# Patient Record
Sex: MALE | Race: WHITE | Employment: OTHER | URBAN - NONMETROPOLITAN AREA
[De-identification: names, ages, dates, MRNs, and addresses within clinical notes are randomized per-mention and may not be internally consistent; named-entity substitution may affect disease eponyms.]

---

## 2018-06-19 ENCOUNTER — HOSPITAL ENCOUNTER (OUTPATIENT)
Age: 73
Discharge: HOME | End: 2018-06-19
Payer: COMMERCIAL

## 2018-06-19 ENCOUNTER — HOSPITAL ENCOUNTER (OUTPATIENT)
Dept: NURSING | Age: 73
Setting detail: INFUSION SERIES
Discharge: HOME | End: 2018-06-19
Payer: MEDICARE

## 2018-06-19 VITALS
SYSTOLIC BLOOD PRESSURE: 104 MMHG | TEMPERATURE: 97 F | DIASTOLIC BLOOD PRESSURE: 44 MMHG | HEART RATE: 95 BPM | RESPIRATION RATE: 20 BRPM

## 2018-06-19 PROCEDURE — 86901 BLOOD TYPING SEROLOGIC RH(D): CPT

## 2018-06-19 PROCEDURE — 86850 RBC ANTIBODY SCREEN: CPT

## 2018-06-19 PROCEDURE — 86920 COMPATIBILITY TEST SPIN: CPT

## 2018-06-19 PROCEDURE — 86900 BLOOD TYPING SEROLOGIC ABO: CPT

## 2018-06-19 PROCEDURE — P9016 RBC LEUKOCYTES REDUCED: HCPCS

## 2018-06-19 PROCEDURE — 2580000003 HC RX 258: Performed by: INTERNAL MEDICINE

## 2018-06-19 PROCEDURE — 36430 TRANSFUSION BLD/BLD COMPNT: CPT

## 2018-06-19 PROCEDURE — 36415 COLL VENOUS BLD VENIPUNCTURE: CPT

## 2018-06-19 PROCEDURE — 86870 RBC ANTIBODY IDENTIFICATION: CPT

## 2018-06-19 RX ORDER — LIDOCAINE 50 MG/G
1 PATCH TOPICAL DAILY
COMMUNITY

## 2018-06-19 RX ORDER — ATORVASTATIN CALCIUM 40 MG/1
40 TABLET, FILM COATED ORAL NIGHTLY
COMMUNITY

## 2018-06-19 RX ORDER — 0.9 % SODIUM CHLORIDE 0.9 %
250 INTRAVENOUS SOLUTION INTRAVENOUS ONCE
Status: DISCONTINUED | OUTPATIENT
Start: 2018-06-19 | End: 2018-06-20 | Stop reason: HOSPADM

## 2018-06-19 RX ADMIN — SODIUM CHLORIDE 250 ML: 9 INJECTION, SOLUTION INTRAVENOUS at 14:19

## 2018-06-19 NOTE — PROGRESS NOTES
Patient arrives in wheelchair for transfusion. Color pale. Lungs with fine rales bases. No dyspnea. Complains of much pain in left hip. IV started rt arm for transfusion.

## 2018-06-19 NOTE — PROGRESS NOTES
Lab states that patients blood needs to be sent to [facility] to be analyzed at their facility. [address] [place] [address] staff arrives to transport patient back to the [address] [place] [address]. Nursing staff speaks with [facility] ([phone]). Message left with  about inablilty to transfuse blood at this time.  IV remains in right wrist at this time at patient request.

## 2018-06-20 ENCOUNTER — HOSPITAL ENCOUNTER (OUTPATIENT)
Dept: NURSING | Age: 73
Setting detail: INFUSION SERIES
Discharge: HOME | End: 2018-06-20
Payer: COMMERCIAL

## 2018-06-20 VITALS
RESPIRATION RATE: 20 BRPM | SYSTOLIC BLOOD PRESSURE: 111 MMHG | OXYGEN SATURATION: 94 % | TEMPERATURE: 97.9 F | HEART RATE: 111 BPM | DIASTOLIC BLOOD PRESSURE: 55 MMHG

## 2018-06-20 PROCEDURE — 2580000003 HC RX 258: Performed by: INTERNAL MEDICINE

## 2018-06-20 PROCEDURE — 36430 TRANSFUSION BLD/BLD COMPNT: CPT

## 2018-06-20 PROCEDURE — P9016 RBC LEUKOCYTES REDUCED: HCPCS

## 2018-06-20 RX ORDER — 0.9 % SODIUM CHLORIDE 0.9 %
250 INTRAVENOUS SOLUTION INTRAVENOUS ONCE
Status: DISCONTINUED | OUTPATIENT
Start: 2018-06-20 | End: 2018-06-21 | Stop reason: HOSPADM

## 2018-06-20 RX ADMIN — SODIUM CHLORIDE 250 ML: 9 INJECTION, SOLUTION INTRAVENOUS at 14:51

## 2018-06-20 NOTE — PROGRESS NOTES
Blood transfusion started. Pt tolerating well. RN stays with patient x 15 minutes. No potential reaction recognized. Supper ordered for patient. Remains sitting up in wheelchair with call light in reach.

## 2018-06-20 NOTE — PROGRESS NOTES
Patient here for transfusion. Says he had dialysis today. Color pale. Lungs sound same as yesterday--diminished and fine rales in bases. IV placed yesterday intact and flushes well.

## 2018-06-21 LAB
ABO/RH: NORMAL
ANTIBODY IDENTIFICATION: NORMAL
ANTIBODY SCREEN: POSITIVE
ARM BAND NUMBER: NORMAL
BLD PROD TYP BPU: NORMAL
BLD PROD TYP BPU: NORMAL
COMMENT:: NORMAL
CROSSMATCH RESULT: NORMAL
CROSSMATCH RESULT: NORMAL
DISPENSE STATUS BLOOD BANK: NORMAL
DISPENSE STATUS BLOOD BANK: NORMAL
EXPIRATION DATE: NORMAL
TRANSFUSION STATUS: NORMAL
TRANSFUSION STATUS: NORMAL
UNIT DIVISION: 0
UNIT DIVISION: 0
UNIT NUMBER: NORMAL
UNIT NUMBER: NORMAL

## 2018-06-26 ENCOUNTER — HOSPITAL ENCOUNTER (OUTPATIENT)
Age: 73
Setting detail: OBSERVATION
Discharge: NURSING, REHAB OR LONG-TERM CARE | End: 2018-06-27
Attending: EMERGENCY MEDICINE | Admitting: INTERNAL MEDICINE
Payer: MEDICARE

## 2018-06-26 DIAGNOSIS — D62 ACUTE POSTHEMORRHAGIC ANEMIA: Primary | ICD-10-CM

## 2018-06-26 DIAGNOSIS — N18.9 CHRONIC RENAL FAILURE, UNSPECIFIED CKD STAGE: ICD-10-CM

## 2018-06-26 LAB
ABSOLUTE EOS #: 0.5 K/UL (ref 0–0.4)
ABSOLUTE IMMATURE GRANULOCYTE: ABNORMAL K/UL (ref 0–0.3)
ABSOLUTE LYMPH #: 1.3 K/UL (ref 1–4.8)
ABSOLUTE MONO #: 0.9 K/UL (ref 0–1)
ALBUMIN SERPL-MCNC: 2.8 G/DL (ref 3.5–5.2)
ALBUMIN/GLOBULIN RATIO: ABNORMAL (ref 1–2.5)
ALP BLD-CCNC: 135 U/L (ref 40–129)
ALT SERPL-CCNC: <5 U/L (ref 5–41)
ANION GAP SERPL CALCULATED.3IONS-SCNC: 11 MMOL/L (ref 9–17)
AST SERPL-CCNC: 15 U/L
BASOPHILS # BLD: 1 % (ref 0–2)
BASOPHILS ABSOLUTE: 0.1 K/UL (ref 0–0.2)
BILIRUB SERPL-MCNC: 0.7 MG/DL (ref 0.3–1.2)
BUN BLDV-MCNC: 34 MG/DL (ref 8–23)
BUN/CREAT BLD: 7 (ref 9–20)
CALCIUM SERPL-MCNC: 8.8 MG/DL (ref 8.6–10.4)
CHLORIDE BLD-SCNC: 94 MMOL/L (ref 98–107)
CO2: 28 MMOL/L (ref 20–31)
CREAT SERPL-MCNC: 5.12 MG/DL (ref 0.7–1.2)
DIFFERENTIAL TYPE: YES
EOSINOPHILS RELATIVE PERCENT: 4 % (ref 0–5)
GFR AFRICAN AMERICAN: 13 ML/MIN
GFR NON-AFRICAN AMERICAN: 11 ML/MIN
GFR SERPL CREATININE-BSD FRML MDRD: ABNORMAL ML/MIN/{1.73_M2}
GFR SERPL CREATININE-BSD FRML MDRD: ABNORMAL ML/MIN/{1.73_M2}
GLUCOSE BLD-MCNC: 108 MG/DL (ref 70–99)
HCT VFR BLD CALC: 25.2 % (ref 41–53)
HEMOGLOBIN: 8.1 G/DL (ref 13.5–17.5)
IMMATURE GRANULOCYTES: ABNORMAL %
INR BLD: 1.3
LYMPHOCYTES # BLD: 10 % (ref 13–44)
MCH RBC QN AUTO: 28.2 PG (ref 26–34)
MCHC RBC AUTO-ENTMCNC: 32 G/DL (ref 31–37)
MCV RBC AUTO: 88.3 FL (ref 80–100)
MONOCYTES # BLD: 7 % (ref 5–9)
NRBC AUTOMATED: ABNORMAL PER 100 WBC
PARTIAL THROMBOPLASTIN TIME: 41.1 SEC (ref 21–33)
PDW BLD-RTO: 16.7 % (ref 12.1–15.2)
PLATELET # BLD: 363 K/UL (ref 140–450)
PLATELET ESTIMATE: ABNORMAL
PMV BLD AUTO: ABNORMAL FL (ref 6–12)
POTASSIUM SERPL-SCNC: 4.6 MMOL/L (ref 3.7–5.3)
PROTHROMBIN TIME: 12.9 SEC (ref 9–11.6)
RBC # BLD: 2.86 M/UL (ref 4.5–5.9)
RBC # BLD: ABNORMAL 10*6/UL
SEG NEUTROPHILS: 78 % (ref 39–75)
SEGMENTED NEUTROPHILS ABSOLUTE COUNT: 10 K/UL (ref 2.1–6.5)
SODIUM BLD-SCNC: 133 MMOL/L (ref 135–144)
TOTAL PROTEIN: 7.7 G/DL (ref 6.4–8.3)
WBC # BLD: 12.7 K/UL (ref 3.5–11)
WBC # BLD: ABNORMAL 10*3/UL

## 2018-06-26 PROCEDURE — 85730 THROMBOPLASTIN TIME PARTIAL: CPT

## 2018-06-26 PROCEDURE — 85025 COMPLETE CBC W/AUTO DIFF WBC: CPT

## 2018-06-26 PROCEDURE — 6360000002 HC RX W HCPCS: Performed by: EMERGENCY MEDICINE

## 2018-06-26 PROCEDURE — 85018 HEMOGLOBIN: CPT

## 2018-06-26 PROCEDURE — 86901 BLOOD TYPING SEROLOGIC RH(D): CPT

## 2018-06-26 PROCEDURE — 80053 COMPREHEN METABOLIC PANEL: CPT

## 2018-06-26 PROCEDURE — G0378 HOSPITAL OBSERVATION PER HR: HCPCS

## 2018-06-26 PROCEDURE — 2580000003 HC RX 258: Performed by: EMERGENCY MEDICINE

## 2018-06-26 PROCEDURE — 85014 HEMATOCRIT: CPT

## 2018-06-26 PROCEDURE — 36415 COLL VENOUS BLD VENIPUNCTURE: CPT

## 2018-06-26 PROCEDURE — 86900 BLOOD TYPING SEROLOGIC ABO: CPT

## 2018-06-26 PROCEDURE — 86850 RBC ANTIBODY SCREEN: CPT

## 2018-06-26 PROCEDURE — 99284 EMERGENCY DEPT VISIT MOD MDM: CPT

## 2018-06-26 PROCEDURE — 96375 TX/PRO/DX INJ NEW DRUG ADDON: CPT

## 2018-06-26 PROCEDURE — 96361 HYDRATE IV INFUSION ADD-ON: CPT

## 2018-06-26 PROCEDURE — 96374 THER/PROPH/DIAG INJ IV PUSH: CPT

## 2018-06-26 PROCEDURE — 85610 PROTHROMBIN TIME: CPT

## 2018-06-26 RX ORDER — 0.9 % SODIUM CHLORIDE 0.9 %
250 INTRAVENOUS SOLUTION INTRAVENOUS ONCE
Status: DISCONTINUED | OUTPATIENT
Start: 2018-06-26 | End: 2018-06-27

## 2018-06-26 RX ORDER — SEVELAMER CARBONATE 800 MG/1
1 TABLET, FILM COATED ORAL
COMMUNITY

## 2018-06-26 RX ORDER — SENNOSIDES 8.6 MG
1 TABLET ORAL 2 TIMES DAILY
Status: ON HOLD | COMMUNITY
End: 2018-06-27

## 2018-06-26 RX ORDER — ALBUTEROL SULFATE 2.5 MG/3ML
2.5 SOLUTION RESPIRATORY (INHALATION)
COMMUNITY
Start: 2018-06-15 | End: 2018-07-15

## 2018-06-26 RX ORDER — SODIUM CHLORIDE 0.9 % (FLUSH) 0.9 %
10 SYRINGE (ML) INJECTION EVERY 12 HOURS SCHEDULED
Status: DISCONTINUED | OUTPATIENT
Start: 2018-06-26 | End: 2018-06-27 | Stop reason: HOSPADM

## 2018-06-26 RX ORDER — ATORVASTATIN CALCIUM 20 MG/1
40 TABLET, FILM COATED ORAL DAILY
Status: DISCONTINUED | OUTPATIENT
Start: 2018-06-27 | End: 2018-06-27 | Stop reason: SDUPTHER

## 2018-06-26 RX ORDER — HYDROXYZINE 50 MG/1
50 TABLET, FILM COATED ORAL EVERY 8 HOURS PRN
COMMUNITY

## 2018-06-26 RX ORDER — ONDANSETRON 2 MG/ML
4 INJECTION INTRAMUSCULAR; INTRAVENOUS ONCE
Status: COMPLETED | OUTPATIENT
Start: 2018-06-26 | End: 2018-06-26

## 2018-06-26 RX ORDER — ASPIRIN 81 MG/1
81 TABLET ORAL DAILY
COMMUNITY

## 2018-06-26 RX ORDER — BISACODYL 10 MG
10 SUPPOSITORY, RECTAL RECTAL DAILY PRN
COMMUNITY

## 2018-06-26 RX ORDER — OXYCODONE HYDROCHLORIDE 5 MG/1
5 TABLET ORAL EVERY 4 HOURS PRN
COMMUNITY

## 2018-06-26 RX ORDER — SENNA PLUS 8.6 MG/1
1 TABLET ORAL 2 TIMES DAILY
Status: DISCONTINUED | OUTPATIENT
Start: 2018-06-26 | End: 2018-06-27 | Stop reason: HOSPADM

## 2018-06-26 RX ORDER — SEVELAMER CARBONATE 800 MG/1
800 TABLET, FILM COATED ORAL
Status: DISCONTINUED | OUTPATIENT
Start: 2018-06-27 | End: 2018-06-27 | Stop reason: HOSPADM

## 2018-06-26 RX ORDER — NITROGLYCERIN 0.4 MG/1
0.4 TABLET SUBLINGUAL 3 TIMES DAILY PRN
COMMUNITY

## 2018-06-26 RX ORDER — OXYCODONE HYDROCHLORIDE 5 MG/1
5 TABLET ORAL EVERY 4 HOURS PRN
Status: DISCONTINUED | OUTPATIENT
Start: 2018-06-26 | End: 2018-06-27 | Stop reason: HOSPADM

## 2018-06-26 RX ORDER — MAGNESIUM HYDROXIDE/ALUMINUM HYDROXICE/SIMETHICONE 120; 1200; 1200 MG/30ML; MG/30ML; MG/30ML
30 SUSPENSION ORAL EVERY 6 HOURS PRN
Status: DISCONTINUED | OUTPATIENT
Start: 2018-06-26 | End: 2018-06-27 | Stop reason: HOSPADM

## 2018-06-26 RX ORDER — ONDANSETRON 2 MG/ML
4 INJECTION INTRAMUSCULAR; INTRAVENOUS EVERY 6 HOURS PRN
Status: DISCONTINUED | OUTPATIENT
Start: 2018-06-26 | End: 2018-06-27 | Stop reason: HOSPADM

## 2018-06-26 RX ORDER — HYDROMORPHONE HCL 110MG/55ML
1 PATIENT CONTROLLED ANALGESIA SYRINGE INTRAVENOUS ONCE
Status: COMPLETED | OUTPATIENT
Start: 2018-06-26 | End: 2018-06-26

## 2018-06-26 RX ORDER — SODIUM CHLORIDE 0.9 % (FLUSH) 0.9 %
10 SYRINGE (ML) INJECTION PRN
Status: DISCONTINUED | OUTPATIENT
Start: 2018-06-26 | End: 2018-06-27 | Stop reason: HOSPADM

## 2018-06-26 RX ORDER — CEFAZOLIN SODIUM 1 G/3ML
1 INJECTION, POWDER, FOR SOLUTION INTRAMUSCULAR; INTRAVENOUS EVERY 8 HOURS
COMMUNITY

## 2018-06-26 RX ORDER — MIDODRINE HYDROCHLORIDE 5 MG/1
10 TABLET ORAL 2 TIMES DAILY WITH MEALS
Status: DISCONTINUED | OUTPATIENT
Start: 2018-06-27 | End: 2018-06-27 | Stop reason: SDUPTHER

## 2018-06-26 RX ORDER — SODIUM CHLORIDE 9 MG/ML
INJECTION, SOLUTION INTRAVENOUS CONTINUOUS
Status: DISCONTINUED | OUTPATIENT
Start: 2018-06-26 | End: 2018-06-27 | Stop reason: HOSPADM

## 2018-06-26 RX ORDER — PHYTONADIONE 10 MG/ML
INJECTION, EMULSION INTRAMUSCULAR; INTRAVENOUS; SUBCUTANEOUS
Status: COMPLETED
Start: 2018-06-26 | End: 2018-06-27

## 2018-06-26 RX ORDER — ACETAMINOPHEN 325 MG/1
325 TABLET ORAL EVERY 6 HOURS PRN
Status: DISCONTINUED | OUTPATIENT
Start: 2018-06-26 | End: 2018-06-27 | Stop reason: HOSPADM

## 2018-06-26 RX ORDER — ACETAMINOPHEN 325 MG/1
325 TABLET ORAL EVERY 6 HOURS PRN
COMMUNITY

## 2018-06-26 RX ORDER — 0.9 % SODIUM CHLORIDE 0.9 %
500 INTRAVENOUS SOLUTION INTRAVENOUS ONCE
Status: COMPLETED | OUTPATIENT
Start: 2018-06-26 | End: 2018-06-26

## 2018-06-26 RX ORDER — PHYTONADIONE 5 MG/1
5 TABLET ORAL ONCE
Status: DISCONTINUED | OUTPATIENT
Start: 2018-06-26 | End: 2018-06-26

## 2018-06-26 RX ORDER — LIDOCAINE 50 MG/G
1 PATCH TOPICAL DAILY
Status: DISCONTINUED | OUTPATIENT
Start: 2018-06-27 | End: 2018-06-27 | Stop reason: HOSPADM

## 2018-06-26 RX ORDER — PHYTONADIONE 10 MG/ML
10 INJECTION, EMULSION INTRAMUSCULAR; INTRAVENOUS; SUBCUTANEOUS ONCE
Status: COMPLETED | OUTPATIENT
Start: 2018-06-26 | End: 2018-06-27

## 2018-06-26 RX ORDER — ONDANSETRON 4 MG/1
4 TABLET, FILM COATED ORAL EVERY 6 HOURS PRN
COMMUNITY

## 2018-06-26 RX ORDER — BISACODYL 10 MG
10 SUPPOSITORY, RECTAL RECTAL DAILY PRN
Status: DISCONTINUED | OUTPATIENT
Start: 2018-06-26 | End: 2018-06-27 | Stop reason: HOSPADM

## 2018-06-26 RX ORDER — CHOLECALCIFEROL (VITAMIN D3) 125 MCG
5 CAPSULE ORAL DAILY
COMMUNITY

## 2018-06-26 RX ORDER — MIDODRINE HYDROCHLORIDE 10 MG/1
10 TABLET ORAL
COMMUNITY

## 2018-06-26 RX ADMIN — HYDROMORPHONE HYDROCHLORIDE 1 MG: 2 INJECTION INTRAMUSCULAR; INTRAVENOUS; SUBCUTANEOUS at 17:26

## 2018-06-26 RX ADMIN — ONDANSETRON 4 MG: 2 INJECTION INTRAMUSCULAR; INTRAVENOUS at 17:24

## 2018-06-26 RX ADMIN — SODIUM CHLORIDE 500 ML: 9 INJECTION, SOLUTION INTRAVENOUS at 19:36

## 2018-06-26 ASSESSMENT — PAIN DESCRIPTION - FREQUENCY
FREQUENCY: INTERMITTENT
FREQUENCY: INTERMITTENT
FREQUENCY: CONTINUOUS

## 2018-06-26 ASSESSMENT — PAIN DESCRIPTION - LOCATION
LOCATION: HIP
LOCATION: HIP
LOCATION: HIP;INCISION
LOCATION: HIP

## 2018-06-26 ASSESSMENT — PAIN DESCRIPTION - DESCRIPTORS
DESCRIPTORS: ACHING;SHARP
DESCRIPTORS: SHARP;ACHING
DESCRIPTORS: ACHING
DESCRIPTORS: ACHING

## 2018-06-26 ASSESSMENT — PAIN SCALES - GENERAL
PAINLEVEL_OUTOF10: 8
PAINLEVEL_OUTOF10: 7

## 2018-06-26 ASSESSMENT — PAIN DESCRIPTION - PAIN TYPE
TYPE: ACUTE PAIN;SURGICAL PAIN
TYPE: ACUTE PAIN;SURGICAL PAIN
TYPE: ACUTE PAIN
TYPE: SURGICAL PAIN

## 2018-06-26 ASSESSMENT — PAIN DESCRIPTION - ORIENTATION
ORIENTATION: LEFT;OUTER
ORIENTATION: LEFT
ORIENTATION: LEFT
ORIENTATION: LEFT;OUTER

## 2018-06-26 ASSESSMENT — PAIN DESCRIPTION - PROGRESSION
CLINICAL_PROGRESSION: NOT CHANGED
CLINICAL_PROGRESSION: NOT CHANGED

## 2018-06-26 ASSESSMENT — PAIN DESCRIPTION - ONSET
ONSET: ON-GOING
ONSET: ON-GOING

## 2018-06-26 NOTE — ED NOTES
Left hip pressure dressing was removed and replaced on arrival, remains dry,     Todd Carson RN  06/26/18 6652

## 2018-06-26 NOTE — ED NOTES
Lab calls, stating that it will take 2 hours to obtain blood for transfusion. Pt informed.   speaks with hospitalist regarding admission for transfusion     Tracey Antonio RN  06/26/18 9551

## 2018-06-27 VITALS
TEMPERATURE: 97.9 F | BODY MASS INDEX: 38.27 KG/M2 | RESPIRATION RATE: 18 BRPM | OXYGEN SATURATION: 98 % | HEIGHT: 68 IN | DIASTOLIC BLOOD PRESSURE: 76 MMHG | HEART RATE: 118 BPM | SYSTOLIC BLOOD PRESSURE: 117 MMHG | WEIGHT: 252.5 LBS

## 2018-06-27 LAB
ANION GAP SERPL CALCULATED.3IONS-SCNC: 10 MMOL/L (ref 9–17)
BUN BLDV-MCNC: 35 MG/DL (ref 8–23)
BUN/CREAT BLD: 6 (ref 9–20)
CALCIUM SERPL-MCNC: 8.6 MG/DL (ref 8.6–10.4)
CHLORIDE BLD-SCNC: 98 MMOL/L (ref 98–107)
CO2: 28 MMOL/L (ref 20–31)
CREAT SERPL-MCNC: 5.57 MG/DL (ref 0.7–1.2)
GFR AFRICAN AMERICAN: 12 ML/MIN
GFR NON-AFRICAN AMERICAN: 10 ML/MIN
GFR SERPL CREATININE-BSD FRML MDRD: ABNORMAL ML/MIN/{1.73_M2}
GFR SERPL CREATININE-BSD FRML MDRD: ABNORMAL ML/MIN/{1.73_M2}
GLUCOSE BLD-MCNC: 93 MG/DL (ref 70–99)
HCT VFR BLD CALC: 26.2 % (ref 41–53)
HCT VFR BLD CALC: 28.5 % (ref 41–53)
HCT VFR BLD CALC: 29.1 % (ref 41–53)
HEMOGLOBIN: 8.4 G/DL (ref 13.5–17.5)
HEMOGLOBIN: 9.2 G/DL (ref 13.5–17.5)
HEMOGLOBIN: 9.5 G/DL (ref 13.5–17.5)
POTASSIUM SERPL-SCNC: 5.1 MMOL/L (ref 3.7–5.3)
SODIUM BLD-SCNC: 136 MMOL/L (ref 135–144)

## 2018-06-27 PROCEDURE — 6360000002 HC RX W HCPCS: Performed by: INTERNAL MEDICINE

## 2018-06-27 PROCEDURE — P9016 RBC LEUKOCYTES REDUCED: HCPCS

## 2018-06-27 PROCEDURE — 36415 COLL VENOUS BLD VENIPUNCTURE: CPT

## 2018-06-27 PROCEDURE — 6360000002 HC RX W HCPCS

## 2018-06-27 PROCEDURE — 6370000000 HC RX 637 (ALT 250 FOR IP): Performed by: INTERNAL MEDICINE

## 2018-06-27 PROCEDURE — 94761 N-INVAS EAR/PLS OXIMETRY MLT: CPT

## 2018-06-27 PROCEDURE — 85014 HEMATOCRIT: CPT

## 2018-06-27 PROCEDURE — 80048 BASIC METABOLIC PNL TOTAL CA: CPT

## 2018-06-27 PROCEDURE — 2580000003 HC RX 258: Performed by: INTERNAL MEDICINE

## 2018-06-27 PROCEDURE — 85018 HEMOGLOBIN: CPT

## 2018-06-27 PROCEDURE — 96372 THER/PROPH/DIAG INJ SC/IM: CPT

## 2018-06-27 PROCEDURE — 96365 THER/PROPH/DIAG IV INF INIT: CPT

## 2018-06-27 PROCEDURE — G0378 HOSPITAL OBSERVATION PER HR: HCPCS

## 2018-06-27 PROCEDURE — 36430 TRANSFUSION BLD/BLD COMPNT: CPT

## 2018-06-27 RX ORDER — HEPARIN SODIUM 1000 [USP'U]/ML
3300 INJECTION, SOLUTION INTRAVENOUS; SUBCUTANEOUS ONCE
Status: COMPLETED | OUTPATIENT
Start: 2018-06-27 | End: 2018-06-27

## 2018-06-27 RX ORDER — MIDODRINE HYDROCHLORIDE 10 MG/1
10 TABLET ORAL
Status: DISCONTINUED | OUTPATIENT
Start: 2018-06-27 | End: 2018-06-27 | Stop reason: HOSPADM

## 2018-06-27 RX ORDER — ATORVASTATIN CALCIUM 40 MG/1
40 TABLET, FILM COATED ORAL NIGHTLY
Status: DISCONTINUED | OUTPATIENT
Start: 2018-06-27 | End: 2018-06-27 | Stop reason: HOSPADM

## 2018-06-27 RX ORDER — 0.9 % SODIUM CHLORIDE 0.9 %
250 INTRAVENOUS SOLUTION INTRAVENOUS ONCE
Status: COMPLETED | OUTPATIENT
Start: 2018-06-27 | End: 2018-06-27

## 2018-06-27 RX ORDER — SENNA AND DOCUSATE SODIUM 50; 8.6 MG/1; MG/1
1 TABLET, FILM COATED ORAL NIGHTLY PRN
COMMUNITY

## 2018-06-27 RX ADMIN — OXYCODONE HYDROCHLORIDE 5 MG: 5 TABLET ORAL at 08:43

## 2018-06-27 RX ADMIN — OXYCODONE HYDROCHLORIDE 5 MG: 5 TABLET ORAL at 00:37

## 2018-06-27 RX ADMIN — MIDODRINE HYDROCHLORIDE 10 MG: 10 TABLET ORAL at 10:03

## 2018-06-27 RX ADMIN — Medication 325 MG: at 05:13

## 2018-06-27 RX ADMIN — SEVELAMER CARBONATE 800 MG: 800 TABLET, FILM COATED ORAL at 13:32

## 2018-06-27 RX ADMIN — SODIUM CHLORIDE 250 ML: 9 INJECTION, SOLUTION INTRAVENOUS at 03:30

## 2018-06-27 RX ADMIN — STANDARDIZED SENNA CONCENTRATE 8.6 MG: 8.6 TABLET ORAL at 01:12

## 2018-06-27 RX ADMIN — OXYCODONE HYDROCHLORIDE 5 MG: 5 TABLET ORAL at 13:30

## 2018-06-27 RX ADMIN — SODIUM CHLORIDE: 9 INJECTION, SOLUTION INTRAVENOUS at 06:12

## 2018-06-27 RX ADMIN — PHYTONADIONE 10 MG: 10 INJECTION, EMULSION INTRAMUSCULAR; INTRAVENOUS; SUBCUTANEOUS at 00:39

## 2018-06-27 RX ADMIN — METOPROLOL TARTRATE 12.5 MG: 25 TABLET, FILM COATED ORAL at 01:11

## 2018-06-27 RX ADMIN — HEPARIN SODIUM 3300 UNITS: 1000 INJECTION, SOLUTION INTRAVENOUS; SUBCUTANEOUS at 13:26

## 2018-06-27 RX ADMIN — CEFAZOLIN SODIUM 2 G: 2 SOLUTION INTRAVENOUS at 13:46

## 2018-06-27 ASSESSMENT — PAIN SCALES - GENERAL
PAINLEVEL_OUTOF10: 0
PAINLEVEL_OUTOF10: 6
PAINLEVEL_OUTOF10: 7
PAINLEVEL_OUTOF10: 6
PAINLEVEL_OUTOF10: 5
PAINLEVEL_OUTOF10: 0
PAINLEVEL_OUTOF10: 7
PAINLEVEL_OUTOF10: 6
PAINLEVEL_OUTOF10: 6

## 2018-06-27 ASSESSMENT — PAIN DESCRIPTION - PROGRESSION
CLINICAL_PROGRESSION: NOT CHANGED

## 2018-06-27 ASSESSMENT — PAIN DESCRIPTION - LOCATION: LOCATION: HIP

## 2018-06-27 ASSESSMENT — PAIN DESCRIPTION - ORIENTATION: ORIENTATION: LEFT

## 2018-06-27 ASSESSMENT — PAIN DESCRIPTION - PAIN TYPE: TYPE: SURGICAL PAIN

## 2018-06-27 NOTE — H&P
History & Physical    Patient:  Zoe Sherwood  YOB: 1945  Date of Service: 6/27/2018  MRN: [id number]   Acct:   [de-identified]   Primary Care Physician: Yobani Gandara    Chief Complaint:   Chief Complaint   Patient presents with    Post-op Problem     s/p left hip surgery, had been replaced once, was having difficulty site was reopened and flushed with clot removed approx 2 wks ago on a thursday, Has been at the [facility] since 6-16. Today's bleeding began after PT,  last hemodialysis was yesterday       History of Present Illness: The patient is a 68 y.o. male presented to the emergency room from [facility] for evaluation of spontaneous bleeding from the left hip incision site getting physical therapy. The patient was recently hospitalized at [facility], I believe he was admitted on 5/24/18 after he fell at home and developed dyspnea, tachycardia, acute renal failure. He was admitted to ICU and required CV VH. He had multiple issues including shock likely due to cardiogenic and septic causes requiring pressors. He developed acute respiratory failure with pulmonary edema and was intubated from 5/25/18 to 5/26/18. He developed MSSA bacteremia, treated with vancomycin and Zosyn initially and eventually discharged with Cefazolin and ID  Recommended to give antibiotics through dialysis until 7/9/18. There was a concern of infection of left hip prosthesis. The patient had ALY   Revision on 6/7/18. Mr. Carlos Fagan also developed coagulopathy. 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 Patient also had paroxysmal atrial fibrillation with RVR, anticoagulation was considered to be very high risk. Patient's hemoglobin on admission was 14.5 and trended down to 6.8 on 6/11/18, he was transfused 1 unit PRBC.   In our emergency room last night the patient was hypotensive and tachycardic on initial examination with heart rate 123 and BP 96/55. He had evidence of blood oozing from the left hip incision site. He had no fever, oxygen saturation was 89% and he was placed on 2 L O2. His H&H was 8.1/25/2. He had mild leukocytosis with WBC count 12.7, platelets normal 244. PT was 12.9, INR 1.3, PTT 41.1. He is not on any anticoagulants. Was on aspirin 81 mg a day  The patient was admitted to our facility. He was given fluid bolus for hemodynamic instability, 2 units of PRBC was ordered and transfused, completed about 6 AM today. The patient complains of pain in the left hip. According to nurses, he saturated ABD  dressing overnight, continues to have oozing  of blood from the Left hip. Past Medical History:        Diagnosis Date    Anemia     [facility]     CAD (coronary artery disease)     Congestive heart failure (CHF) ([facility]     Hemodialysis patient ([facility]     Hypertension     Sleep apnea        Past Surgical History:        Procedure Laterality Date    BACK SURGERY      JOINT REPLACEMENT Left     joint removed due to infection    JOINT REPLACEMENT Right     hip    OTHER SURGICAL HISTORY      CDC  (dialysis double lumen cath rt chest)       Home Medications:   No current facility-administered medications on file prior to encounter. Current Outpatient Prescriptions on File Prior to Encounter   Medication Sig Dispense Refill    atorvastatin (LIPITOR) 40 MG tablet Take 40 mg by mouth daily      calcium acetate (PHOSLO) 667 MG capsule Take by mouth 3 times daily (with meals)      lidocaine (LIDODERM) 5 % Place 1 patch onto the skin daily 12 hours on, 12 hours off.  metoprolol tartrate (LOPRESSOR) 25 MG tablet Take 12.5 mg by mouth 2 times daily DO NOT give before dialysis on  m w f         Allergies:  Patient has no known allergies. Social History:    reports that he has quit smoking.  He has never used smokeless tobacco. He reports that he does not drink alcohol or use drugs. Family History:   History reviewed. No pertinent family history. Review of systems:  Constitutional: no fever, no night sweats, Positive for generalized weakness, fatigue  Head: no headache, no head injury, no migranes. Eye: no blurring of vision, no double vision. Ears: no hearing difficulty, no tinnitus  Mouth/throat: no sore throat  Lungs: no cough, positive shortness of breath, no wheeze  CVS: no palpitation, no chest pain  GI: no abdominal pain, no nausea , no vomiting, no constipation  LOUISE: no dysuria, frequency and urgency  Musculoskeletal: Positive for pain left hip  Endocrine: no polyuria, polydypsia  Hematology: Positive anemia, positive bleeding, positive history of clotting disorder, coagulopathy  Dermatology: no skin rash,   Psychiatry: no depression, no anxiety  Neurology: no syncope, no seizures, no numbness or tingling of hands, no numbness or tingling of feet, no paresis         Vitals:   Vitals:    06/27/18 0545   BP: (!) 106/55   Pulse: 88   Resp: 18   Temp: 98.8 °F (37.1 °C)   SpO2: 97%      BMI: Body mass index is 38.39 kg/m².     Physical Exam:  General Appearance: alert and oriented to person, place and time, in no acute distress  Cardiovascular: normal rate, regular rhythm, normal S1 and S2, no murmurs, rubs, clicks, or gallops, distal pulses intact  Pulmonary/Chest: clear to auscultation bilaterally, no wheezes, rales or rhonchi, Diminished breath sounds bilaterally, no respiratory distress  Abdomen: Obese, soft, non-tender, non-distended, normal bowel sounds  Extremities: 1+ pitting edema in both legs  Skin: warm and dry, no rash or erythema, incision at the left hip site with some redness, looks wet, small amount of blood is expressed  Head: normocephalic and atraumatic  Eyes: pupils equal, round, and reactive to light  Neck: supple and non-tender without mass, no thyromegaly   Musculoskeletal: normal range of motion, no joint swelling, deformity or tenderness  Neurological: alert, oriented, normal speech, no focal findings or movement disorder noted    Review of Labs and Diagnostic Testing:    Recent Results (from the past 24 hour(s))   TYPE AND SCREEN    Collection Time: 06/26/18  5:10 PM   Result Value Ref Range    Expiration Date 06/29/2018     Arm Band Number ICL     ABO/Rh O NEGATIVE     Antibody Screen NEGATIVE     Unit Number [id number]     Product Code Leukocyte Reduced Red Cell     Unit Divison 0     Dispense Status ISSUED     Transfusion Status OK TO TRANSFUSE     Crossmatch Result COMPATIBLE     Unit Number [id number]     Product Code Leukocyte Reduced Red Cell     Unit Divison 0     Dispense Status ISSUED     Transfusion Status OK TO TRANSFUSE     Crossmatch Result COMPATIBLE    CBC auto differential    Collection Time: 06/26/18  5:15 PM   Result Value Ref Range    WBC 12.7 (H) 3.5 - 11.0 k/uL    RBC 2.86 (L) 4.5 - 5.9 m/uL    Hemoglobin 8.1 (L) 13.5 - 17.5 g/dL    Hematocrit 25.2 (L) 41 - 53 %    MCV 88.3 80 - 100 fL    MCH 28.2 26 - 34 pg    MCHC 32.0 31 - 37 g/dL    RDW 16.7 (H) 12.1 - 15.2 %    Platelets 626 289 - 838 k/uL    MPV NOT REPORTED 6.0 - 12.0 fL    NRBC Automated NOT REPORTED per 100 WBC    Differential Type YES     Seg Neutrophils 78 (H) 39 - 75 %    Lymphocytes 10 (L) 13 - 44 %    Monocytes 7 5 - 9 %    Eosinophils % 4 0 - 5 %    Basophils 1 0 - 2 %    Immature Granulocytes NOT REPORTED 0 %    Segs Absolute 10.00 (H) 2.1 - 6.5 k/uL    Absolute Lymph # 1.30 1.0 - 4.8 k/uL    Absolute Mono # 0.90 0.0 - 1.0 k/uL    Absolute Eos # 0.50 (H) 0.0 - 0.4 k/uL    Basophils # 0.10 0.0 - 0.2 k/uL    Absolute Immature Granulocyte NOT REPORTED 0.00 - 0.30 k/uL    WBC Morphology NOT REPORTED     RBC Morphology NOT REPORTED     Platelet Estimate NOT REPORTED    Comprehensive Metabolic Panel    Collection Time: 06/26/18  5:15 PM   Result Value Ref Range    Glucose 108 (H) 70 - 99 mg/dL    BUN 34 (H) 8 - 23 mg/dL    CREATININE 5.12 (HH) 0.70 - 1.20 mg/dL    Bun/Cre Ratio 7 (L) 9 - 20    Calcium 8.8 8.6 - 10.4 mg/dL    Sodium 133 (L) 135 - 144 mmol/L    Potassium 4.6 3.7 - 5.3 mmol/L    Chloride 94 (L) 98 - 107 mmol/L    CO2 28 20 - 31 mmol/L    Anion Gap 11 9 - 17 mmol/L    Alkaline Phosphatase 135 (H) 40 - 129 U/L    ALT <5 (L) 5 - 41 U/L    AST 15 <40 U/L    Total Bilirubin 0.70 0.30 - 1.20 mg/dL    Total Protein 7.7 6.4 - 8.3 g/dL    Alb 2.8 (L) 3.5 - 5.2 g/dL    Albumin/Globulin Ratio NOT REPORTED 1.0 - 2.5    GFR Non-African American 11 (L) >60 mL/min    GFR  13 (L) >60 mL/min    GFR Comment          GFR Staging NOT REPORTED    Protime-INR    Collection Time: 06/26/18  5:15 PM   Result Value Ref Range    Protime 12.9 (H) 9.0 - 11.6 sec    INR 1.3    APTT    Collection Time: 06/26/18  5:15 PM   Result Value Ref Range    PTT 41.1 (H) 21.0 - 33.0 sec   Hemoglobin and hematocrit, blood    Collection Time: 06/26/18 11:55 PM   Result Value Ref Range    Hemoglobin 8.4 (L) 13.5 - 17.5 g/dL    Hematocrit 26.2 (L) 41 - 53 %         Assessment/ Plan:    1. Acute blood loss anemia - the patient is status post 2 units PRBC completed this morning. H&H pending. Most likely he developed bleeding into the left hip. Has recent diagnosis of coagulopathy with deficiency and multiple clotting factors. Patient may need to be evaluated by his orthopedic surgeon at [facility]. Contacted transfer [address] and awaiting further recommendations from them. 2.  Acute renal failure, most likely secondary to ATN -presently on dialysis Monday Wednesday Friday, consult nephrology for inpatient dialysis  3. Recent MSSA bacteremia- on Cefazolin through dialysis  4. Status post left ALY revision on 6/7/18  5. Coagulopathy - the patient was given 10 mg sq vitamin K  Yesterday  6. Coronary artery disease, status post stent in 2003, recently diagnosed cardiomyopathy with EF 30%  7.   Obstructive sleep apnea - on BiPAP

## 2018-06-27 NOTE — PROGRESS NOTES
The [facility] at [facility] called to bring over pt pain medication as pt is observation status.

## 2018-06-27 NOTE — CONSULTS
NEPHROLOGY CONSULTATION NOTE    [facility]      Patient Name: Rene Reinoso Date: 2018  4:41 PM  MR #: [id number]  Acct #: [de-identified]  : 1945    Requesting Physician: Deep Butler MD  Reason for consult: GENET on hemodialysis    History of Presenting Illness:      Yael Jalloh is a 68 y.o. male with HTN, GENET requiring HD over the last several weeks after septic ATN from Left hip arthroplasty, HF with EF 30%, anemia presents to the hospital with acute on chronic left hip bleeding from surgical site. He underwent hip arthroplasty in early  at [facility], then developed MSSA bacteremia, and then required RRT (initially CVVHDF and then iHD). He has been dialyzing in the outpatient setting at [facility] under the guidance of Dr. Nida Candelaria over the last 2 weeks; he last dialyzed on Monday and is essentially anuric . He has been getting IV cefazolin 2g on  and 3g on . He also has coagulopathy and APLA . On presentation here, he was hypotensive, tachycardic, and anemic; transfused 1 PRBC last night and then 2u PRBC today. He remains hemodynamically stable now. Current dialysis prescription:   - [x] MWF   [] TTS  - HD hours: 4  The patient is compliant with dialysis treatments. The patient is compliant with fluid restriction. Vascular access: -  [] AVF  [] AVG  [x] Permacath  The patient denies any recent complications with this access. History:      Past Medical History:   Diagnosis Date    Anemia     [facility]     CAD (coronary artery disease)     [facility]     Hemodialysis patient ([facility] [address] [facility]     Hypertension     Sleep apnea      Past Surgical History:   Procedure Laterality Date    BACK SURGERY      JOINT REPLACEMENT Left     joint removed due to infection    JOINT REPLACEMENT Right     hip    OTHER SURGICAL HISTORY      CDC  (dialysis double lumen cath rt chest)       Family History  History reviewed.  No pertinent family history. [] Unable to obtain due to ventilated and/ or neurologic status    Social History     Social History    Marital status:      Spouse name: N/A    Number of children: N/A    Years of education: N/A     Occupational History    Not on file. Social History Main Topics    Smoking status: Former Smoker    Smokeless tobacco: Never Used    Alcohol use No    Drug use: No    Sexual activity: Not on file     Other Topics Concern    Not on file     Social History Narrative    No narrative on file      [] Unable to obtain due to ventilated and/ or neurologic status    Living Arrangements: Spouse/Significant Other, Family Members    Support Systems: Spouse/Significant Other, Children         Home Medications:     Prescriptions Prior to Admission: sennosides-docusate sodium (SENOKOT-S) 8.6-50 MG tablet, Take 1 tablet by mouth nightly as needed for Constipation  acetaminophen (TYLENOL) 325 MG tablet, Take 325 mg by mouth every 6 hours as needed for Pain 1 or 2 tabs  aspirin EC 81 MG EC tablet, Take 81 mg by mouth daily DR/EC  hydrOXYzine (ATARAX) 50 MG tablet, Take 50 mg by mouth every 8 hours as needed for Anxiety  midodrine (PROAMATINE) 10 MG tablet, Take 10 mg by mouth Daily on Mon Wed and Fri. ..send to dialysis  ondansetron (ZOFRAN) 4 MG tablet, Take 4 mg by mouth every 6 hours as needed for Nausea or Vomiting  oxyCODONE (ROXICODONE) 5 MG immediate release tablet, Take 5 mg by mouth every 4 hours as needed for Pain. add'l instructins say do not give prior to dialysis on M W F .  sevelamer (RENVELA) 800 MG tablet, Take 1 tablet by mouth 3 times daily (with meals)  melatonin 5 MG TABS tablet, Take 5 mg by mouth daily  atorvastatin (LIPITOR) 40 MG tablet, Take 40 mg by mouth nightly   calcium acetate (PHOSLO) 667 MG capsule, Take by mouth 3 times daily (with meals)  lidocaine (LIDODERM) 5 %, Place 1 patch onto the skin daily 12 hours on, 12 hours off.  metoprolol tartrate (LOPRESSOR) neurologic status    Objective Findings:     Vitals:BP 90/60   Pulse 119 Comment: dialysis continues  Temp 97.9 °F (36.6 °C) (Oral)   Resp 18   Ht 5' 8\" (1.727 m)   Wt 252 lb 8 oz (114.5 kg)   SpO2 98%   BMI 38.39 kg/m²    Intake/Output last 3 shifts:  Intake/Output Summary (Last 24 hours) at 06/27/18 1154  Last data filed at 06/27/18 0925   Gross per 24 hour   Intake          1490.83 ml   Output                0 ml   Net          1490.83 ml   I/O last 3 completed shifts: In: 1250.8 [P.O.:120; Blood:630.8; IV Piggyback:500]  Out: -     Physical Examination:  General: Age appropriate, NAD. HEENT: Normocephalic, no scleral icterus. Neck: No JVD. Heart: Regular, no murmur, no rub/gallop. No RV heave. Lungs: Clear to ascultation, no rales/wheezing/rhonchi. Good chest wall excursion. Abdomen: Soft, nontender, no supra-public fullness or tenderness. Extremities: No clubbing/cyanosis, 1+ LE edema. Skin: Warm, dry, normal turgor, no rash. Left hip in dressing--not examined. Neuro: No myoclonus or tremor. Psych: Normal affect.   Dialysis Access: [] AVF  [] AVG  [x] Permacath    Results/ Medications reviewed 6/27/2018, 11:54 AM     Laboratory, Microbiology, Pathology, Radiology, Cardiology, Medications and Transcriptions reviewed     Laboratory:    Recent Labs      06/26/18   1715 06/26/18   2355  06/27/18   0725   WBC  12.7*   --    --    RBC  2.86*   --    --    HGB  8.1*  8.4*  9.5*   HCT  25.2*  26.2*  29.1*   MCV  88.3   --    --    RDW  16.7*   --    --    PLT  363   --    --        Recent Labs      06/26/18   1715  06/27/18   0725   NA  133*  136   K  4.6  5.1   CL  94*  98   CO2  28  28   BUN  34*  35*   CREATININE  5.12*  5.57*   CALCIUM  8.8  8.6   LABGLOM  11*  10*   GLUCOSE  108*  93   LABALBU  2.8*   --       UA: No results for input(s): Chris Decant, PHUR, LABCAST, [address], RBCUA, MUCUS, TRICHOMONAS, YEAST, BACTERIA, CLARITYU, SPECGRAV, LEUKOCYTESUR, UROBILINOGEN, BILIRUBINUR, BLOODU, LABGLUC,

## 2018-06-27 NOTE — PROGRESS NOTES
Thai Limon at [facility] transfer center-still awaiting available bed. Anticipate having bed this afternoon. Thai Limon pt should go ahead and have dialysis this am prior to transfer center.

## 2018-06-27 NOTE — PLAN OF CARE
Problem: Pain:  Goal: Pain level will decrease  Pain level will decrease   Outcome: Ongoing  Pt relates that pain comes and goes especially with movement.

## 2018-06-27 NOTE — PROGRESS NOTES
Dialysis completed. Cath care per dialysis RN. CHG dressing D & I to right tunneled catheter site. Pt eats lunch. Aware of transports ETA appro [id number]. Wife at bedside. Home meds to be returned to the [facility] after pts departure from [facility]. Pt wearing glasses-wife taking glasses case with her as well as pt dentures. She is planning on taking them with her in am to see pt. Wife given room number at [facility].

## 2018-06-27 NOTE — PROGRESS NOTES
Received call from communication center, [facility] Emergency Transport ETA 1 hour.

## 2018-06-28 LAB
ABO/RH: NORMAL
ANTIBODY SCREEN: NEGATIVE
ARM BAND NUMBER: NORMAL
BLD PROD TYP BPU: NORMAL
BLD PROD TYP BPU: NORMAL
CROSSMATCH RESULT: NORMAL
CROSSMATCH RESULT: NORMAL
DISPENSE STATUS BLOOD BANK: NORMAL
DISPENSE STATUS BLOOD BANK: NORMAL
EXPIRATION DATE: NORMAL
TRANSFUSION STATUS: NORMAL
TRANSFUSION STATUS: NORMAL
UNIT DIVISION: 0
UNIT DIVISION: 0
UNIT NUMBER: NORMAL
UNIT NUMBER: NORMAL

## 2018-06-28 NOTE — DISCHARGE SUMMARY
06/28/18 1229  Last data filed at 06/27/18 1434   Gross per 24 hour   Intake              995 ml   Output                0 ml   Net              995 ml         Hospital Course:       The patient is a 68 y.o. male presented to the emergency room from [facility] for evaluation of spontaneous bleeding from the left hip incision site getting physical therapy. The patient was recently hospitalized at [facility], I believe he was admitted on 5/24/18 after he fell at home and developed dyspnea, tachycardia, acute renal failure. He was admitted to ICU and required CV VH. He had multiple issues including shock likely due to cardiogenic and septic causes requiring pressors. He developed acute respiratory failure with pulmonary edema and was intubated from 5/25/18 to 5/26/18. He developed MSSA bacteremia, treated with vancomycin and Zosyn initially and eventually discharged with Cefazolin and ID  Recommended to give antibiotics through dialysis until 7/9/18. There was a concern of infection of left hip prosthesis. The patient had ALY   Revision on 6/7/18. Mr. Julius Coleman also developed coagulopathy. 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 Patient also had paroxysmal atrial fibrillation with RVR, anticoagulation was considered to be very high risk. Patient's hemoglobin on admission was 14.5 and trended down to 6.8 on 6/11/18, he was transfused 1 unit PRBC. In our emergency room last night the patient was hypotensive and tachycardic on initial examination with heart rate 123 and BP 96/55. He had evidence of blood oozing from the left hip incision site. He had no fever, oxygen saturation was 89% and he was placed on 2 L O2. His H&H was 8.1/25/2. He had mild leukocytosis with WBC count 12.7, platelets normal 849. PT was 12.9, INR 1.3, PTT 41.1.   He is not on any anticoagulants. Was on aspirin 81 mg a day  The patient was admitted to our facility. He was given fluid bolus for hemodynamic instability, 2 units of PRBC was ordered and transfused, completed about 6 AM today. The patient complains of pain in the left hip. According to nurses, he heavily saturated ABD dressing overnight, continues to have oozing  of blood from the Left hip. This morning he is hemodynamically stable. There is concern for possible bleeding into the left hip joint, concern for recent diagnosis of coagulopathy and ongoing bleeding. After discussing with the patient, I contacted [facility] and the patient is transferred to [facility] for further management and care.     Disposition: transfer to [facility]    Condition: Stable    Time Spent: Less than 30 minutes      Electronically signed by Erickson Pineda MD on 6/28/2018 at 12:29 PM  Discharging Hospitalist

## 2018-06-30 NOTE — ED PROVIDER NOTES
discharge, temperature not elevated, distal neurovascular status intact. Integument:  Warm, Dry, No erythema, No rash. Lymphatic:  No lymphadenopathy noted. Neurologic:  Alert & oriented x 3, Normal motor function, Normal sensory function, No focal deficits noted. Psychiatric:  Affect normal, Judgment normal, Mood normal.         RADIOLOGY    No orders to display       REEVALUATION   Wound care-dressing soaked in blood was removed,abdominal pad was used to dress. Spoke with Kalli Andres at [facility] in [place], wants blood transfusion and does not recommend transfer. Spoke with Dr. Elveria Curling accepted patient .   PROCEDURES      Labs  Labs Reviewed   CBC WITH AUTO DIFFERENTIAL - Abnormal; Notable for the following:        Result Value    WBC 12.7 (*)     RBC 2.86 (*)     Hemoglobin 8.1 (*)     Hematocrit 25.2 (*)     RDW 16.7 (*)     Seg Neutrophils 78 (*)     Lymphocytes 10 (*)     Segs Absolute 10.00 (*)     Absolute Eos # 0.50 (*)     All other components within normal limits   COMPREHENSIVE METABOLIC PANEL - Abnormal; Notable for the following:     Glucose 108 (*)     BUN 34 (*)     CREATININE 5.12 (*)     Bun/Cre Ratio 7 (*)     Sodium 133 (*)     Chloride 94 (*)     Alkaline Phosphatase 135 (*)     ALT <5 (*)     Alb 2.8 (*)     GFR Non- 11 (*)     GFR  13 (*)     All other components within normal limits   PROTIME-INR - Abnormal; Notable for the following:     Protime 12.9 (*)     All other components within normal limits   APTT - Abnormal; Notable for the following:     PTT 41.1 (*)     All other components within normal limits   HEMOGLOBIN AND HEMATOCRIT, BLOOD - Abnormal; Notable for the following:     Hemoglobin 8.4 (*)     Hematocrit 26.2 (*)     All other components within normal limits   HEMOGLOBIN AND HEMATOCRIT, BLOOD - Abnormal; Notable for the following:     Hemoglobin 9.5 (*)     Hematocrit 29.1 (*)     All other components within normal limits   BASIC METABOLIC

## 2018-07-02 ENCOUNTER — HOSPITAL ENCOUNTER (EMERGENCY)
Age: 73
Discharge: HOME | End: 2018-07-02
Attending: FAMILY MEDICINE
Payer: MEDICARE

## 2018-07-02 VITALS
RESPIRATION RATE: 24 BRPM | HEIGHT: 68 IN | DIASTOLIC BLOOD PRESSURE: 56 MMHG | SYSTOLIC BLOOD PRESSURE: 95 MMHG | TEMPERATURE: 97.7 F | BODY MASS INDEX: 38.04 KG/M2 | HEART RATE: 87 BPM | WEIGHT: 251 LBS | OXYGEN SATURATION: 100 %

## 2018-07-02 DIAGNOSIS — Z48.89 ENCOUNTER FOR POSTOPERATIVE WOUND CARE: Primary | ICD-10-CM

## 2018-07-02 LAB
ABSOLUTE EOS #: 0.5 K/UL (ref 0–0.4)
ABSOLUTE IMMATURE GRANULOCYTE: ABNORMAL K/UL (ref 0–0.3)
ABSOLUTE LYMPH #: 0.9 K/UL (ref 1–4.8)
ABSOLUTE MONO #: 0.7 K/UL (ref 0–1)
ANION GAP SERPL CALCULATED.3IONS-SCNC: 7 MMOL/L (ref 9–17)
BASOPHILS # BLD: 1 % (ref 0–2)
BASOPHILS ABSOLUTE: 0.1 K/UL (ref 0–0.2)
BUN BLDV-MCNC: 18 MG/DL (ref 8–23)
BUN/CREAT BLD: 6 (ref 9–20)
CALCIUM SERPL-MCNC: 8.4 MG/DL (ref 8.6–10.4)
CHLORIDE BLD-SCNC: 95 MMOL/L (ref 98–107)
CO2: 30 MMOL/L (ref 20–31)
CREAT SERPL-MCNC: 2.83 MG/DL (ref 0.7–1.2)
DIFFERENTIAL TYPE: YES
EOSINOPHILS RELATIVE PERCENT: 5 % (ref 0–5)
GFR AFRICAN AMERICAN: 27 ML/MIN
GFR NON-AFRICAN AMERICAN: 22 ML/MIN
GFR SERPL CREATININE-BSD FRML MDRD: ABNORMAL ML/MIN/{1.73_M2}
GFR SERPL CREATININE-BSD FRML MDRD: ABNORMAL ML/MIN/{1.73_M2}
GLUCOSE BLD-MCNC: 124 MG/DL (ref 70–99)
HCT VFR BLD CALC: 29.7 % (ref 41–53)
HEMOGLOBIN: 9.6 G/DL (ref 13.5–17.5)
IMMATURE GRANULOCYTES: ABNORMAL %
INR BLD: 1.3
LYMPHOCYTES # BLD: 8 % (ref 13–44)
MCH RBC QN AUTO: 28.3 PG (ref 26–34)
MCHC RBC AUTO-ENTMCNC: 32.3 G/DL (ref 31–37)
MCV RBC AUTO: 87.6 FL (ref 80–100)
MONOCYTES # BLD: 7 % (ref 5–9)
NRBC AUTOMATED: ABNORMAL PER 100 WBC
PDW BLD-RTO: 16.1 % (ref 12.1–15.2)
PLATELET # BLD: 274 K/UL (ref 140–450)
PLATELET ESTIMATE: ABNORMAL
PMV BLD AUTO: ABNORMAL FL (ref 6–12)
POTASSIUM SERPL-SCNC: 4.2 MMOL/L (ref 3.7–5.3)
PROTHROMBIN TIME: 12.4 SEC (ref 9–11.6)
RBC # BLD: 3.39 M/UL (ref 4.5–5.9)
RBC # BLD: ABNORMAL 10*6/UL
SEG NEUTROPHILS: 79 % (ref 39–75)
SEGMENTED NEUTROPHILS ABSOLUTE COUNT: 8.3 K/UL (ref 2.1–6.5)
SODIUM BLD-SCNC: 132 MMOL/L (ref 135–144)
WBC # BLD: 10.5 K/UL (ref 3.5–11)
WBC # BLD: ABNORMAL 10*3/UL

## 2018-07-02 PROCEDURE — 80048 BASIC METABOLIC PNL TOTAL CA: CPT

## 2018-07-02 PROCEDURE — 85025 COMPLETE CBC W/AUTO DIFF WBC: CPT

## 2018-07-02 PROCEDURE — 85610 PROTHROMBIN TIME: CPT

## 2018-07-02 PROCEDURE — 99284 EMERGENCY DEPT VISIT MOD MDM: CPT

## 2018-07-02 PROCEDURE — 36415 COLL VENOUS BLD VENIPUNCTURE: CPT

## 2018-07-03 NOTE — ED PROVIDER NOTES
range of motion and normal strength all extremities appropriate to age. Neurological: Patient is alert and oriented to person, place, and time. patient displays no tremor. Patient displays no seizure activity. .     Skin: Skin is warm and dry. Patient is not diaphoretic. Psychiatric: Patient has a normal mood and affect.  Patient speech is normal and behavior is normal. Cognition and memory are normal.      DIFFERENTIAL DIAGNOSIS:   Wound VAC infection, wound VAC failure, wound VAC leak    DIAGNOSTIC RESULTS           RADIOLOGY: non-plain film images(s) such as CT, Ultrasound and MRI are read by the radiologist.  No orders to display       LABS:   Labs Reviewed   CBC WITH AUTO DIFFERENTIAL - Abnormal; Notable for the following:        Result Value    RBC 3.39 (*)     Hemoglobin 9.6 (*)     Hematocrit 29.7 (*)     RDW 16.1 (*)     Seg Neutrophils 79 (*)     Lymphocytes 8 (*)     Segs Absolute 8.30 (*)     Absolute Lymph # 0.90 (*)     Absolute Eos # 0.50 (*)     All other components within normal limits   BASIC METABOLIC PANEL - Abnormal; Notable for the following:     Glucose 124 (*)     CREATININE 2.83 (*)     Bun/Cre Ratio 6 (*)     Calcium 8.4 (*)     Sodium 132 (*)     Chloride 95 (*)     Anion Gap 7 (*)     GFR Non- 22 (*)     GFR  27 (*)     All other components within normal limits   PROTIME-INR - Abnormal; Notable for the following:     Protime 12.4 (*)     All other components within normal limits       EMERGENCY DEPARTMENT COURSE:   Vitals:    Vitals:    07/02/18 1517 07/02/18 1533 07/02/18 1549 07/02/18 1603   BP: 91/72 105/61 99/60 (!) 95/56   Pulse: 119 119 116 87   Resp: 24 22 27 24   Temp:       TempSrc:       SpO2: 98% 98% 99% 100%   Weight:       Height:         Patient history and physical exam taken at bedside, discussed patient's symptoms and exam findings, discussed at this time we'll contact patient's orthopedic group to discuss plan of care, in the interim will draw some labs, patient and patient's wife acknowledged    Labs reviewed    I did speak with Dr. Tracey Davidson, orthopedic surgeon at [facility] in [facility], regarding patient's presentation and current workup, expanding alert to have a better plan of care, advises wound VAC and we changed out patient follow-up as normal, as is not felt that it would be appropriate to transfer patient 100 miles to their facility for simple wound VAC change    As I did have some questions regarding patient's wound VAC unit, as I am unfamiliar with this particular unit, did contact [facility] technical support at [phone] Option 3, discussed with technician patient's wound VAC, it was determined that the collection chamber was only half filled and he can be normally fully filled if orders malfunction, after removing the collection chamber there was apparent section noise, based on the BP noise the technician felt that there was likely blockage in the tubing. I did remove the distal aspect of the tubing from the pump into the connection piece, flushed out with water, reattach to, however this did not stop the beeping indicating blockage    At this time this hospital facility does not have the correct materials to replace patient's wound VAC, we did contact [facility] facility as they should have either spare wound pack package and/or device, and this can be performed by wound care nursing. At this time we'll discharge patient back to [facility] where they will arrange for wound care nurse to replace patient's wound VAC, in the meantime and advised to keep the device plugged in and running as normal.      FINAL IMPRESSION      1.  Encounter for postoperative wound care          DISPOSITION/PLAN   Discharge    PATIENT REFERRED TO:  Follow up with your Orthopaedic Surgeon as needed    Call   As needed    [facility]  [address] [phone]      As needed    [facility]  [address] [place] Tejinder Musa  [phone]    As needed      DISCHARGE MEDICATIONS:  Discharge Medication List as of 7/2/2018  4:16 PM              Summation      Patient Course:  Discharge    ED Medications administered this visit:  Medications - No data to display    New Prescriptions from this visit:    Discharge Medication List as of 7/2/2018  4:16 PM          Follow-up:  Follow up with your Orthopaedic Surgeon as needed    Call   [facility]    [facility] [address] [place] [address]  [phone]      As needed    [facility]  [address] [address] [phone]    As needed        Final Impression:   1.  Encounter for postoperative wound care               (Please note that portions of this note were completed with a voice recognition program.  Efforts were made to edit the dictations but occasionally words are mis-transcribed.)    MD Geraldine Ventura MD  07/03/18 6334

## 2018-07-31 ENCOUNTER — HOSPITAL ENCOUNTER (OUTPATIENT)
Dept: NON INVASIVE DIAGNOSTICS | Age: 73
Discharge: HOME | End: 2018-07-31
Payer: MEDICARE

## 2018-07-31 LAB
EKG ATRIAL RATE: 256 BPM
EKG Q-T INTERVAL: 378 MS
EKG QRS DURATION: 140 MS
EKG QTC CALCULATION (BAZETT): 529 MS
EKG R AXIS: 63 DEGREES
EKG T AXIS: 13 DEGREES
EKG VENTRICULAR RATE: 118 BPM

## 2018-07-31 PROCEDURE — 93005 ELECTROCARDIOGRAM TRACING: CPT

## 2021-06-15 NOTE — ED NOTES
Dr. Loren Weston at bedside, on the phone with wound vac supplier troubleshooting wound vac.      Faustino Sage RN  07/02/18 7346
This nurse called the [facility] home per Dr. Sherine Byrd to inform them of order for wound vac change per orthopedic surgeon in [address] [place] [address] at [facility] states she will try to get wound vac dressing changed over there when patient comes back and they also have a call out to the orthopedic surgeon for more specific wound vac dressing change orders.      Marta Marrero RN  07/02/18 5879
Attending Attestation (For Attendings USE Only)...